# Patient Record
Sex: FEMALE | Race: WHITE
[De-identification: names, ages, dates, MRNs, and addresses within clinical notes are randomized per-mention and may not be internally consistent; named-entity substitution may affect disease eponyms.]

---

## 2019-02-01 ENCOUNTER — HOSPITAL ENCOUNTER (EMERGENCY)
Dept: HOSPITAL 25 - UCCORT | Age: 84
Discharge: HOME | End: 2019-02-01
Payer: MEDICARE

## 2019-02-01 VITALS — DIASTOLIC BLOOD PRESSURE: 85 MMHG | SYSTOLIC BLOOD PRESSURE: 145 MMHG

## 2019-02-01 DIAGNOSIS — I10: ICD-10-CM

## 2019-02-01 DIAGNOSIS — R09.81: ICD-10-CM

## 2019-02-01 DIAGNOSIS — Z79.899: ICD-10-CM

## 2019-02-01 DIAGNOSIS — J06.9: Primary | ICD-10-CM

## 2019-02-01 DIAGNOSIS — R05: ICD-10-CM

## 2019-02-01 DIAGNOSIS — F32.9: ICD-10-CM

## 2019-02-01 PROCEDURE — G0463 HOSPITAL OUTPT CLINIC VISIT: HCPCS

## 2019-02-01 PROCEDURE — 99212 OFFICE O/P EST SF 10 MIN: CPT

## 2019-02-01 NOTE — UC
Respiratory Complaint HPI





- HPI Summary


HPI Summary: 


84-year-old female presents with a ten-day history of nasal congestion, sinus 

congestion, sore throat, and a productive cough.  Denies fever, body aches, ear 

pain, dysphagia, chest pain, shortness of breath, abdominal pain, nausea, or 

vomiting.








- History of Current Complaint


Chief Complaint: UCGeneralIllness


Stated Complaint: CHEST CONGESTION,COUGH


Time Seen by Provider: 02/01/19 17:43


Hx Obtained From: Patient


Pain Intensity: 0





- Allergies/Home Medications


Allergies/Adverse Reactions: 


 Allergies











Allergy/AdvReac Type Severity Reaction Status Date / Time


 


No Known Allergies Allergy   Verified 02/01/19 16:00











Home Medications: 


 Home Medications





Acetaminophen [Acetaminophen Extra Strength] 1,000 mg PO ONCE 02/01/19 [History 

Confirmed 02/01/19]


Anastrozole [Arimidex] 1 mg PO BEDTIME 02/01/19 [History Confirmed 02/01/19]


Escitalopram Oxalate [Lexapro 10 mg] 10 mg PO DAILY 02/01/19 [History Confirmed 

02/01/19]


Lisinopril 40 mg PO BID 02/01/19 [History Confirmed 02/01/19]


Phenylephrine/Dm/Acetaminop/GG [Mucinex Cold-Flu-Sorethroat Lq] 10 ml PO DAILY 

02/01/19 [History Confirmed 02/01/19]











PMH/Surg Hx/FS Hx/Imm Hx


Previously Healthy: Yes


Cardiovascular History: Hypertension


Psychological History: Depression


Cancer History: Breast Cancer





- Surgical History


Surgical History: Yes


Surgery Procedure, Year, and Place: RIGHT MASTECTOMY.  GALLBLADDER REMOVED





- Family History


Known Family History: Positive: Non-Contributory





- Social History


Occupation: Retired


Lives: With Family


Alcohol Use: None


Substance Use Type: None


Smoking Status (MU): Never Smoked Tobacco





Review of Systems


All Other Systems Reviewed And Are Negative: Yes


Constitutional: Negative: Fever, Chills, Fatigue


Skin: Negative: Rash


Eyes: Negative: Drainage, Eye Redness


ENT: Positive: Sore Throat, Nasal Discharge, Sinus Congestion.  Negative: Ear 

Ache, Sinus Pain/Tenderness


Respiratory: Positive: Cough.  Negative: Shortness Of Breath


Cardiovascular: Negative: Palpitations, Chest Pain


Gastrointestinal: Negative: Abdominal Pain, Vomiting, Diarrhea, Nausea


Genitourinary: Positive: Negative


Musculoskeletal: Positive: Negative


Neurological: Positive: Negative


Is Patient Immunocompromised?: No





Physical Exam





- Summary


Physical Exam Summary: 


GENERAL APPEARANCE: Well developed, well nourished, alert and cooperative, and 

appears to be in no acute distress.





EYES: Conjunctiva clear. No drainage. Vision is grossly intact.





EARS: External auditory canals and tympanic membranes clear, hearing grossly 

intact.





NOSE: Mild-moderate nasal congestion.





THROAT: Mild pharyngeal erythema with cobblestoning. No tonsilar swelling or 

exudate. 





NECK: Neck supple, non-tender without lymphadenopathy.





CARDIAC: Normal S1 and S2. No S3, S4 or murmurs. Rhythm is regular. There is no 

peripheral edema, cyanosis or pallor. Extremities are warm and well perfused. 

Capillary refill is less than 2 seconds.





LUNGS: Clear to auscultation without rales, rhonchi, wheezing or diminished 

breath sounds. Loose non-productive cough.





ABDOMEN: Positive bowel sounds. Soft, nondistended, nontender. No guarding or 

rebound. No masses or hepatosplenomegally.





MUSKULOSKELETAL: ROM intact to all extremities. No joint erythema or 

tenderness. Normal muscular development. Normal gait.





SKIN: Skin normal color, texture and turgor with no lesions or eruptions.





Triage Information Reviewed: Yes


Vital Signs: 


 Initial Vital Signs











Temp  98.7 F   02/01/19 15:54


 


Pulse  92   02/01/19 15:54


 


Resp  18   02/01/19 15:54


 


BP  145/85   02/01/19 15:54


 


Pulse Ox  97   02/01/19 15:54











Vital Signs Reviewed: Yes





 Diagnostic Evaluation





- Laboratory


O2 Sat by Pulse Oximetry: 97





Respiratory Course/Dx





- Course


Course Of Treatment: 84-year-old female presents with a ten-day history of 

nasal congestion, sinus congestion, sore throat, and a productive cough.  

Denies fever, body aches, ear pain, dysphagia, chest pain, shortness of breath, 

abdominal pain, nausea, or vomiting.  Afebrile.  Vital signs stable.  Exam 

reveals holding her adult female in no acute distress with mild to moderate 

nasal congestion, mild pharyngeal erythema with cobblestoning, loose productive 

cough but clear bilateral breath sounds.  Remainder of exam is unremarkable.  

Considering the duration of her symptoms equated treat her with a course of 

azithromycin for an upper respiratory infection as well as recommend 

symptomatic treatment including Tessalon Perles one Every 8 hours as needed for 

cough.  She is to follow-up with her primary care provider in 5 days if 

symptoms do not improve.  Anticipatory guidance and warning symptoms are 

reviewed with patient.  Verbalized understanding and agrees with plan of care.





- Differential Dx/Diagnosis


Differential Diagnosis/HQI/PQRI: Bronchitis, Influenza, Lower Resp Infection, 

Sinusitis


Provider Diagnosis: 


 Upper respiratory infection with cough and congestion








Discharge





- Sign-Out/Discharge


Documenting (check all that apply): Patient Departure


All imaging exams completed and their final reports reviewed: No Studies





- Discharge Plan


Condition: Stable


Disposition: HOME


Prescriptions: 


Azithromyxin ELNEI (NF) [Z-Eleni (Zithromax) 250 mg tabs #6] 2 tab PO .TODAY, THEN 

1 DAILY #6 tab


Benzonatate CAP* [Tessalon 100 MG CAP*] 100 mg PO TID PRN #30 cap


 PRN Reason: Cough


Referrals: 


Cydney Campbell PA [Primary Care Provider] - 5 Days (If no improvement in 

symptoms.)


Additional Instructions: 


Your symptoms are consistent with a upper respiratory infection with cough. 

Considering the duration of your symptoms we will treat you with an antibiotic.





Start azithromycin 2 tabs today then 1 tab a day for the next 4 days.





Get plenty of rest.





Drink plenty of fluids.





Run a cool mist humidifer in your room at night.





Take over the counter acetaminophen (Tylenol) according to directions as needed 

for pain or fever.





Take Tessalon Perles 1 cap every 8 hours as needed for cough.





Follow up with your primary care provider in 5 days if symptoms do not improve.





Seek immediate medical attention in the emergency room if you have fever 

greater than 100.5 F despite taking acetaminophen or ibuprofen, have chest pain

, difficulty breathing, or have any worsening of symptoms.





- Billing Disposition and Condition


Condition: STABLE


Disposition: Home





- Attestation Statements


Provider Attestation: 





I was available for consult. This patient was seen by the [facility]. The patient was 

not presented to, seen by, or examined by me. EK